# Patient Record
Sex: FEMALE | Race: WHITE
[De-identification: names, ages, dates, MRNs, and addresses within clinical notes are randomized per-mention and may not be internally consistent; named-entity substitution may affect disease eponyms.]

---

## 2019-01-02 ENCOUNTER — HOSPITAL ENCOUNTER (EMERGENCY)
Dept: HOSPITAL 74 - JER | Age: 34
LOS: 1 days | Discharge: HOME | End: 2019-01-03
Payer: COMMERCIAL

## 2019-01-02 VITALS — BODY MASS INDEX: 22.8 KG/M2

## 2019-01-02 DIAGNOSIS — D50.9: ICD-10-CM

## 2019-01-02 DIAGNOSIS — Z3A.01: ICD-10-CM

## 2019-01-02 DIAGNOSIS — O26.891: Primary | ICD-10-CM

## 2019-01-02 LAB
ANION GAP SERPL CALC-SCNC: 7 MMOL/L (ref 8–16)
ANISOCYTOSIS BLD QL: (no result)
APPEARANCE UR: CLEAR
BASOPHILS # BLD: 1.2 % (ref 0–2)
BILIRUB UR STRIP.AUTO-MCNC: NEGATIVE MG/DL
BUN SERPL-MCNC: 12 MG/DL (ref 7–18)
CALCIUM SERPL-MCNC: 8.9 MG/DL (ref 8.5–10.1)
CHLORIDE SERPL-SCNC: 107 MMOL/L (ref 98–107)
CO2 SERPL-SCNC: 22 MMOL/L (ref 21–32)
COLOR UR: (no result)
CREAT SERPL-MCNC: 0.6 MG/DL (ref 0.55–1.3)
DEPRECATED RDW RBC AUTO: 20.6 % (ref 11.6–15.6)
EOSINOPHIL # BLD: 3.4 % (ref 0–4.5)
EPITH CASTS URNS QL MICRO: (no result) /HPF
GLUCOSE SERPL-MCNC: 91 MG/DL (ref 74–106)
HCT VFR BLD CALC: 22.7 % (ref 32.4–45.2)
HGB BLD-MCNC: 6.7 GM/DL (ref 10.7–15.3)
HYALINE CASTS URNS QL MICRO: 1 /LPF
KETONES UR QL STRIP: NEGATIVE
LEUKOCYTE ESTERASE UR QL STRIP.AUTO: (no result)
LYMPHOCYTES # BLD: 23.9 % (ref 8–40)
MCH RBC QN AUTO: 18.1 PG (ref 25.7–33.7)
MCHC RBC AUTO-ENTMCNC: 29.5 G/DL (ref 32–36)
MCV RBC: 61.5 FL (ref 80–96)
MONOCYTES # BLD AUTO: 11.6 % (ref 3.8–10.2)
MUCOUS THREADS URNS QL MICRO: (no result)
NEUTROPHILS # BLD: 59.9 % (ref 42.8–82.8)
NITRITE UR QL STRIP: NEGATIVE
OVALOCYTES BLD QL SMEAR: (no result)
PH UR: 6 [PH] (ref 5–8)
PLATELET # BLD AUTO: 376 K/MM3 (ref 134–434)
PLATELET BLD QL SMEAR: ADEQUATE
PMV BLD: 7.5 FL (ref 7.5–11.1)
POTASSIUM SERPLBLD-SCNC: 3.7 MMOL/L (ref 3.5–5.1)
PROT UR QL STRIP: NEGATIVE
PROT UR QL STRIP: NEGATIVE
RBC # BLD AUTO: 3.69 M/MM3 (ref 3.6–5.2)
SODIUM SERPL-SCNC: 136 MMOL/L (ref 136–145)
SP GR UR: 1.01 (ref 1.01–1.03)
UROBILINOGEN UR STRIP-MCNC: NEGATIVE MG/DL (ref 0.2–1)
WBC # BLD AUTO: 6.4 K/MM3 (ref 4–10)

## 2019-01-02 PROCEDURE — P9038 RBC IRRADIATED: HCPCS

## 2019-01-02 PROCEDURE — P9058 RBC, L/R, CMV-NEG, IRRAD: HCPCS

## 2019-01-02 NOTE — PDOC
Rapid Medical Evaluation


Time Seen by Provider: 01/02/19 20:11


Medical Evaluation: 





01/02/19 20:11


I have performed a brief in-person evaluation of this patient.





The patient presents with a chief complaint of: sent for anemia evaluation





Pertinent physical exam findings: conjunctival Pallor. 





I have ordered the following: labs, urine





The patient will proceed to the ED for further evaluation.





**Discharge Disposition





- Diagnosis


 Anemia








- Referrals





- Patient Instructions





- Post Discharge Activity

## 2019-01-02 NOTE — PDOC
History of Present Illness





- General


Chief Complaint: Revisit, Lab Variance


Stated Complaint: PCP SENT/LOW HEMOGLOBIN


Time Seen by Provider: 19 20:11


History Source: Patient


Exam Limitations: No Limitations





Past History





- Past Medical History


Allergies/Adverse Reactions: 


 Allergies











Allergy/AdvReac Type Severity Reaction Status Date / Time


 


No Known Allergies Allergy   Verified 19 20:13











Home Medications: 


Ambulatory Orders





Ferrous Sulfate [Iron] 325 mg PO BID #60 tablet 19 








Cancer: No


Cardiac Disorders: No


CVA: No


COPD: No


 Disorders: Yes (fibroids)





- Surgical History


Appendectomy: No


Cardiac Surgery: No





- Suicide/Smoking/Psychosocial Hx


Smoking History: Never smoked


Information on smoking cessation initiated: No


Hx Alcohol Use: No


Drug/Substance Use Hx: No





*Physical Exam





- Vital Signs


 Last Vital Signs











Temp Pulse Resp BP Pulse Ox


 


 98 F   108 H  20   129/62   100 


 


 19 20:14  19 20:14  19 20:14  19 20:14  19 20:14














- Physical Exam


General Appearance: No: Apparent Distress


HEENT: positive: Other (Slightly pale conjunctiva)


Respiratory/Chest: positive: Lungs Clear, Normal Breath Sounds.  negative: 

Respiratory Distress


Cardiovascular: positive: Regular Rhythm, Regular Rate, S1, S2.  negative: 

Murmur


Gastrointestinal/Abdominal: positive: Soft.  negative: Tender


Neurologic: positive: Fully Oriented, Alert





Moderate Sedation





- Procedure Monitoring


Vital Signs: 


Procedure Monitoring Vital Signs











Temperature  98 F   19 20:14


 


Pulse Rate  108 H  19 20:14


 


Respiratory Rate  20   19 20:14


 


Blood Pressure  129/62   19 20:14


 


O2 Sat by Pulse Oximetry (%)  100   19 20:14











ED Treatment Course





- LABORATORY


CBC & Chemistry Diagram: 


 19 20:43





 19 20:30





- ADDITIONAL ORDERS


Additional order review: 


 Laboratory  Results











  19





  21:42 20:43 20:30


 


Sodium    136


 


Potassium    3.7


 


Chloride    107


 


Carbon Dioxide    22


 


Anion Gap    7 L


 


BUN    12


 


Creatinine    0.6


 


Creat Clearance w eGFR    > 60


 


Random Glucose    91


 


Calcium    8.9


 


Ferritin    4.9 L


 


Beta HCG, Quant    065797.3


 


Urine Color  Ltyellow  


 


Urine Appearance  Clear  


 


Urine pH  6.0  


 


Ur Specific Gravity  1.012  


 


Urine Protein  Negative  


 


Urine Glucose (UA)  Negative  


 


Urine Ketones  Negative  


 


Urine Blood  Negative  


 


Urine Nitrite  Negative  


 


Urine Bilirubin  Negative  


 


Urine Urobilinogen  Negative  


 


Ur Leukocyte Esterase  Trace  


 


Urine HCG, Qual  Positive  


 


Blood Type   A POSITIVE 


 


Antibody Screen   Negative 


 


Crossmatch   See Detail 








 











  19





  20:43


 


RBC  3.69


 


MCV  61.5 L


 


MCHC  29.5 L


 


RDW  20.6 H


 


MPV  7.5


 


Neutrophils %  59.9


 


Lymphocytes %  23.9


 


Monocytes %  11.6 H


 


Eosinophils %  3.4


 


Basophils %  1.2














Medical Decision Making





- Medical Decision Making








32 y/o F  currently 7 weeks 6 days pregnant was sent by her OB/GYN as 

routine lab today showed Hgb of 6.5 and Hct 22. Patient mentions recent 

diagnosis of uterine fibroid as well. Denies active bleeding, fever, cp, abd/

pelvic pain, hematuria, unusual vaginal discharge. Mentions having slight 

nausea and fatigue; also notes mild SOB when climbing up 1 flight of stairs 

over the past week.





Labs here show Hgb of 6.7


Case d/w OB/GYN, Dr. Clarke - recommend just 1 unit of PRBC and patient can be 

discharged; can start on Iron BID 


Patient consented for 1 unit of blood





19 22:55





Patient tolerated 1 unit of PRBCs well.


Stable for d/c





19 05:19








*DC/Admit/Observation/Transfer


Diagnosis at time of Disposition: 


Anemia


Qualifiers:


 Anemia type: iron deficiency Iron deficiency anemia type: unspecified iron 

deficiency Qualified Code(s): D50.9 - Iron deficiency anemia, unspecified








- Discharge Dispostion


Disposition: HOME


Condition at time of disposition: Stable


Decision to Admit order: No





- Prescriptions


Prescriptions: 


Ferrous Sulfate [Iron] 325 mg PO BID #60 tablet





- Referrals





- Patient Instructions


Printed Discharge Instructions:  DI for Iron Deficiency Anemia-Adult


Additional Instructions: 





Thank you for choosing [facility].  It was a pleasure taking 

care of you.  





You received 1 unit of blood for iron deficiency anemia.


Please take iron supplements - 325 mg twice a day. Do not take the same time as 

the prenatals as it can affect their absorption


Please follow-up with your OB/GYN doctor within 2 days.





Return to the Emergency Department if your symptoms worsen or persist, you have 

fever, shortness of breath, chest pain, severe abdominal pain, vomiting, heavy 

vaginal bleeding or other active bleeding or other concerning symptoms. 





- Post Discharge Activity

## 2019-01-03 VITALS — HEART RATE: 88 BPM | TEMPERATURE: 98.3 F | SYSTOLIC BLOOD PRESSURE: 124 MMHG | DIASTOLIC BLOOD PRESSURE: 76 MMHG

## 2019-01-09 LAB
SERUM IRON SATURATION: 3 % (ref 15–55)
TIBC SERPL-MCNC: 486 UG/DL (ref 250–450)
UIBC SERPL-MCNC: 470 UG/DL (ref 131–425)